# Patient Record
Sex: FEMALE | Race: WHITE
[De-identification: names, ages, dates, MRNs, and addresses within clinical notes are randomized per-mention and may not be internally consistent; named-entity substitution may affect disease eponyms.]

---

## 2022-05-12 ENCOUNTER — HOSPITAL ENCOUNTER (EMERGENCY)
Dept: HOSPITAL 63 - ER | Age: 22
Discharge: HOME | End: 2022-05-12
Payer: OTHER GOVERNMENT

## 2022-05-12 VITALS — WEIGHT: 164.24 LBS | HEIGHT: 69 IN | BODY MASS INDEX: 24.33 KG/M2

## 2022-05-12 VITALS — SYSTOLIC BLOOD PRESSURE: 137 MMHG | DIASTOLIC BLOOD PRESSURE: 74 MMHG

## 2022-05-12 DIAGNOSIS — J02.9: Primary | ICD-10-CM

## 2022-05-12 DIAGNOSIS — R11.10: ICD-10-CM

## 2022-05-12 DIAGNOSIS — R13.10: ICD-10-CM

## 2022-05-12 PROCEDURE — 99284 EMERGENCY DEPT VISIT MOD MDM: CPT

## 2022-05-12 NOTE — PHYS DOC
General Adult


EDM:


Chief Complaint:  Sore throat, difficulty swallowing





HPI:


HPI:





Patient is a 21-year-old female who arrives ambulatory to the emergency 

department complaining of a sore throat and difficulty swallowing.  Patient 

reports her symptoms began yesterday.  Patient states in addition to this she 

has had chills and upon waking this morning states her symptoms were worse and 

she had multiple episodes of vomiting.  Despite this she denies any abdominal 

pain.  She further denies any cough, shortness of air or known fevers.  

Additionally she denies any headache or neurological changes.  Furthermore she 

states she is vaccinated against coronavirus and has not had any known sick 

contacts otherwise.  She is awake, alert and nontoxic-appearing.





Review of Systems:


Review of Systems:


Constitutional:  Reports chills.  


Eyes:  Denies change in visual acuity 


HENT: Reports sore throat with difficulty swallowing.   


Respiratory:  Denies cough or shortness of breath 


Cardiovascular:  Denies chest pain or edema 


GI:  Reports vomiting.  Denies abdominal pain, bloody stools or diarrhea 


: Denies dysuria 


Musculoskeletal:  Denies back pain or joint pain 


Integument:  Denies rash 


Neurologic:  Denies headache, focal weakness or sensory changes 


Endocrine:  Denies polyuria or polydipsia 


Lymphatic:  Denies swollen glands 


Psychiatric:  Denies depression or anxiety





Family History:


Family History:


Noncontributory





Physical Exam:


PE:





Constitutional: Patient does speak with muffled tone.  Well developed, well 

nourished, no acute distress, non-toxic appearance. []


HENT: Patient has tonsillar exudates bilaterally with swollen erythematous 

tonsils.  This is consistent with streptococcal pharyngitis.  Normocephalic, 

atraumatic, bilateral external ears normal, oropharynx moist, nose normal. []


Eyes: PERRLA, EOMI, conjunctiva normal, no discharge. [] 


Neck: Normal range of motion, no tenderness, supple, no stridor. [] 


Cardiovascular:Heart rate regular rhythm, no murmur []


Lungs & Thorax:  Bilateral breath sounds clear to auscultation []


Abdomen: Bowel sounds normal, soft, no tenderness, no masses, no pulsatile 

masses. [] 


Skin: Warm, dry, no erythema, no rash. [] 


Back: No tenderness, no CVA tenderness. [] 


Extremities: No tenderness, no cyanosis, no clubbing, ROM intact, no edema. [] 


Neurologic: Alert and oriented X 3, normal motor function, normal sensory 

function, no focal deficits noted. []


Psychologic: Affect normal, judgement normal, mood normal. []





EKG:


EKG:


[]





Radiology/Procedures:


Radiology/Procedures:


[]





Heart Score:


C/O Chest Pain:  No


Risk Factors:


Risk Factors:  DM, Current or recent (<one month) smoker, HTN, HLP, family 

history of CAD, obesity.


Risk Scores:


Score 0 - 3:  2.5% MACE over next 6 weeks - Discharge Home


Score 4 - 6:  20.3% MACE over next 6 weeks - Admit for Clinical Observation


Score 7 - 10:  72.7% MACE over next 6 weeks - Early Invasive Strategies





Course & Med Decision Making:


Course & Med Decision Making


The patient was taken directly to room 5 in the emergency department whereby the

 history physical examination were obtained.  Upon physical examination the 

patient does have tonsillar exudates bilaterally as well as anterior lympha

denopathy.  With these findings the patient does meet the [facility] criteria for 

empirical treatment of streptococcal pharyngitis.  The patient does not have a 

cough.  Additionally she does have tonsillar exudates with anterior 

lymphadenopathy and I question whether or not the patient may have had a fever 

at one time as she does report experiencing chills yesterday evening.  The patie

nt be placed on steroids as well as antibiotics and asked to follow-up with her 

primary care physician in 10 days.  Should she experience any symptoms such as 

fatigue, chest pain or shortness of air I advised that she return to the 

emergency department.  The patient understands and has agreed to do so.  She is 

nontoxic-appearing and stable for discharge.


[]





Dragon Disclaimer:


Dragon Disclaimer:


This electronic medical record was generated, in whole or in part, using a voice

 recognition dictation system.





Departure


Departure:


Impression:  


   Primary Impression:  


   Pharyngitis


Disposition:  01 HOME / SELF CARE / HOMELESS


Condition:  STABLE


Referrals:  


PCP,UNKNOWN (PCP)


Patient Instructions:  Strep Throat





Additional Instructions:  


Follow-up with a primary care physician in 10 to 14 days


Scripts


Prednisone (PREDNISONE) 50 Mg Tablet


50 TAB PO DAILY for inflammation for 5 Days, #5 TAB


   You received this medication in the emergency room today.


   You will starting your next dose tomorrow.


   Prov: KANDACE DE LA FUENTE DO         5/12/22 


Amoxicillin (AMOXICILLIN) 500 Mg Tablet


1 TAB PO TID for strep infection for 10 Days, #30 TAB


   Prov: KANDACE DE LA FUENTE DO         5/12/22











KANDACE DE LA FUENTE DO               May 12, 2022 13:16